# Patient Record
Sex: MALE | Race: BLACK OR AFRICAN AMERICAN | NOT HISPANIC OR LATINO | Employment: PART TIME | URBAN - METROPOLITAN AREA
[De-identification: names, ages, dates, MRNs, and addresses within clinical notes are randomized per-mention and may not be internally consistent; named-entity substitution may affect disease eponyms.]

---

## 2023-04-21 ENCOUNTER — APPOINTMENT (OUTPATIENT)
Dept: LAB | Facility: CLINIC | Age: 33
End: 2023-04-21

## 2023-06-15 ENCOUNTER — OFFICE VISIT (OUTPATIENT)
Dept: OBGYN CLINIC | Facility: CLINIC | Age: 33
End: 2023-06-15
Payer: OTHER MISCELLANEOUS

## 2023-06-15 VITALS
HEART RATE: 60 BPM | DIASTOLIC BLOOD PRESSURE: 60 MMHG | WEIGHT: 160 LBS | BODY MASS INDEX: 21.2 KG/M2 | HEIGHT: 73 IN | SYSTOLIC BLOOD PRESSURE: 120 MMHG

## 2023-06-15 DIAGNOSIS — S92.315A CLOSED NONDISPLACED FRACTURE OF FIRST METATARSAL BONE OF LEFT FOOT, INITIAL ENCOUNTER: Primary | ICD-10-CM

## 2023-06-15 PROCEDURE — 28470 CLTX METATARSAL FX WO MNP EA: CPT | Performed by: ORTHOPAEDIC SURGERY

## 2023-06-15 PROCEDURE — 99203 OFFICE O/P NEW LOW 30 MIN: CPT | Performed by: ORTHOPAEDIC SURGERY

## 2023-06-15 NOTE — PROGRESS NOTES
Assessment/Plan:  1  Closed nondisplaced fracture of first metatarsal bone of left foot, initial encounter  Cam Boot          Vanessa Downs has pain to the base of his first metatarsal and a subtle lucency is visualized on x-ray concerning for nondisplaced fracture  I recommended placing the patient in a cam walker boot which can provide better support and no discomfort with walking  He will likely heal in a total of 4 weeks and has already been immobilized for 1 week  He will follow-up with me in 3 weeks for repeat x-ray and evaluation  I have recommended light duty at work at this time  Fracture / Dislocation Treatment    Date/Time: 6/15/2023 9:00 AM    Performed by: Sb Mohamud DO  Authorized by: Sb Mohamud DO    Patient Location:  Clinic  [place] Protocol:  Consent: Verbal consent obtained  Consent given by: patient  Radiology Images displayed and confirmed  If images not available, report reviewed: imaging studies available      Injury location:  Foot  Location details:  Left foot  Injury type:  Fracture  Fracture type: first metatarsal    Neurovascular status: Neurovascularly intact    Manipulation performed?: No    Immobilization: Cam boot  Subjective:   Chyna Juárez is a 26 y o  male who presents to the office for evaluation for a left foot injury  This is a Worker's Compensation injury  He had an injury to his left foot 1 week ago where he was carrying a box filled with heavy car parts that fell out and fell directly on the top of his left foot  He had pain and discomfort to the dorsum of the left foot and went to urgent care  X-rays demonstrated concern for a nondisplaced fracture at the base of his first metatarsal   He was placed in a postop shoe and referred to our office  He has been walking in the postop shoe and states that this has been continuing to give him pain  The shoe does help  He has aching throbbing pain to the dorsum of the left foot that worsens with walking  He denies any significant bruising but feels like the area is swollen  Review of Systems   Constitutional: Negative for chills, fever and unexpected weight change  HENT: Negative for hearing loss, nosebleeds and sore throat  Eyes: Negative for pain, redness and visual disturbance  Respiratory: Negative for cough, shortness of breath and wheezing  Cardiovascular: Negative for chest pain, palpitations and leg swelling  Gastrointestinal: Negative for abdominal pain, nausea and vomiting  Endocrine: Negative for polyphagia and polyuria  Genitourinary: Negative for dysuria and hematuria  Musculoskeletal:        See HPI   Skin: Negative for rash and wound  Neurological: Negative for dizziness, numbness and headaches  Psychiatric/Behavioral: Negative for decreased concentration and suicidal ideas  The patient is not nervous/anxious  History reviewed  No pertinent past medical history  Past Surgical History:   Procedure Laterality Date   • EYE SURGERY Left     when i was little maybe 3       History reviewed  No pertinent family history  Social History     Occupational History   • Not on file   Tobacco Use   • Smoking status: Never   • Smokeless tobacco: Never   Vaping Use   • Vaping Use: Never used   Substance and Sexual Activity   • Alcohol use: Never   • Drug use: Never   • Sexual activity: Yes     Partners: Female     Birth control/protection: Condom Male, None       No current outpatient medications on file  No Known Allergies    Objective:  Vitals:    06/15/23 0916   BP: 120/60   Pulse: 60            Ortho Exam    Physical Exam  Vitals and nursing note reviewed  Constitutional:       Appearance: Normal appearance  He is well-developed  HENT:      Head: Normocephalic and atraumatic  Right Ear: External ear normal       Left Ear: External ear normal    Eyes:      General: No scleral icterus  Extraocular Movements: Extraocular movements intact  Conjunctiva/sclera: Conjunctivae normal    Cardiovascular:      Rate and Rhythm: Normal rate  Pulmonary:      Effort: Pulmonary effort is normal  No respiratory distress  Musculoskeletal:      Cervical back: Normal range of motion and neck supple  Feet:       Comments: See Ortho exam   Feet:      Comments: Tenderness to palpation over entire first metatarsal most significant at base of first metatarsal   Skin:     General: Skin is warm and dry  Neurological:      Mental Status: He is alert and oriented to person, place, and time  Psychiatric:         Behavior: Behavior normal            I have personally reviewed pertinent films in PACS and my interpretation is as follows:  X-rays of the left foot demonstrate a subtle vertical linear lucency concerning for nondisplaced fracture at the base of the first metatarsal      This document was created using speech voice recognition software  Grammatical errors, random word insertions, pronoun errors, and incomplete sentences are an occasional consequence of this system due to software limitations, ambient noise, and hardware issues  Any formal questions or concerns about content, text, or information contained within the body of this dictation should be directly addressed to the provider for clarification

## 2023-06-15 NOTE — LETTER
Marita 15, 2023     Patient: Jason Andre  YOB: 1990  Date of Visit: 6/15/2023      To Whom it May Concern:    Jason Andre is under my professional care  Arun Batres was seen in my office on 6/15/2023  Arun Batres may work at this time with light duty and no prolonged walking for the next 3 weeks  Please allow for use of the cam walker boot at work for 3 weeks  No heavy lifting over 30 pounds for 3 weeks  If you have any questions or concerns, please don't hesitate to call           Sincerely,          Kingsley Arndt, DO

## 2023-07-11 ENCOUNTER — OFFICE VISIT (OUTPATIENT)
Dept: OBGYN CLINIC | Facility: CLINIC | Age: 33
End: 2023-07-11

## 2023-07-11 ENCOUNTER — APPOINTMENT (OUTPATIENT)
Dept: RADIOLOGY | Facility: CLINIC | Age: 33
End: 2023-07-11
Payer: COMMERCIAL

## 2023-07-11 VITALS
BODY MASS INDEX: 23.33 KG/M2 | DIASTOLIC BLOOD PRESSURE: 61 MMHG | WEIGHT: 176 LBS | HEIGHT: 73 IN | SYSTOLIC BLOOD PRESSURE: 119 MMHG | HEART RATE: 82 BPM

## 2023-07-11 DIAGNOSIS — S92.315A CLOSED NONDISPLACED FRACTURE OF FIRST METATARSAL BONE OF LEFT FOOT, INITIAL ENCOUNTER: ICD-10-CM

## 2023-07-11 DIAGNOSIS — S92.315A CLOSED NONDISPLACED FRACTURE OF FIRST METATARSAL BONE OF LEFT FOOT, INITIAL ENCOUNTER: Primary | ICD-10-CM

## 2023-07-11 PROCEDURE — 99024 POSTOP FOLLOW-UP VISIT: CPT | Performed by: ORTHOPAEDIC SURGERY

## 2023-07-11 PROCEDURE — 73660 X-RAY EXAM OF TOE(S): CPT

## 2023-07-11 NOTE — PROGRESS NOTES
Assessment/Plan:  1. Closed nondisplaced fracture of first metatarsal bone of left foot, initial encounter  XR toe left great min 2 views          Ruth Neri has improvement in his left foot pain and no significant tenderness along the first metatarsal.  I can no longer appreciate any lucency on his x-ray. He is cleared for work without restriction at this time. Follow-up with me only if needed. Subjective:   Harshad Conner is a 26 y.o. male who presents to the office for follow-up for left foot injury. This is a Worker's Compensation injury. He had an injury 4 weeks ago where he dropped a heavy car part on the top of his left foot. He had a lucency on his x-ray concerning for metatarsal fracture. He was placed in a cam walker boot. He states today his foot feels much better and he is no significant pain in the foot and has been wearing the boot up until this morning. He denies any new injury. He has been on light duty at work since the injury. Review of Systems   Constitutional: Negative for chills, fever and unexpected weight change. HENT: Negative for hearing loss, nosebleeds and sore throat. Eyes: Negative for pain, redness and visual disturbance. Respiratory: Negative for cough, shortness of breath and wheezing. Cardiovascular: Negative for chest pain, palpitations and leg swelling. Gastrointestinal: Negative for abdominal pain, nausea and vomiting. Endocrine: Negative for polyphagia and polyuria. Genitourinary: Negative for dysuria and hematuria. Musculoskeletal:        See HPI   Skin: Negative for rash and wound. Neurological: Negative for dizziness, numbness and headaches. Psychiatric/Behavioral: Negative for decreased concentration and suicidal ideas. The patient is not nervous/anxious. History reviewed. No pertinent past medical history. Past Surgical History:   Procedure Laterality Date   • EYE SURGERY Left     when i was little maybe 3       History reviewed. No pertinent family history. Social History     Occupational History   • Not on file   Tobacco Use   • Smoking status: Never   • Smokeless tobacco: Never   Vaping Use   • Vaping Use: Never used   Substance and Sexual Activity   • Alcohol use: Never   • Drug use: Never   • Sexual activity: Yes     Partners: Female     Birth control/protection: Condom Male, None       No current outpatient medications on file. No Known Allergies    Objective:  Vitals:    07/11/23 1622   BP: 119/61   Pulse: 82            Ortho Exam    Physical Exam  Vitals and nursing note reviewed. Constitutional:       Appearance: Normal appearance. He is well-developed. HENT:      Head: Normocephalic and atraumatic. Right Ear: External ear normal.      Left Ear: External ear normal.      Nose: Nose normal.   Eyes:      General: No scleral icterus. Extraocular Movements: Extraocular movements intact. Conjunctiva/sclera: Conjunctivae normal.   Cardiovascular:      Rate and Rhythm: Normal rate. Pulmonary:      Effort: Pulmonary effort is normal. No respiratory distress. Musculoskeletal:      Cervical back: Normal range of motion and neck supple. Comments: See Ortho exam   Feet:      Comments: No tenderness to first metatarsal or great toe  Skin:     General: Skin is warm and dry. Neurological:      General: No focal deficit present. Mental Status: He is alert and oriented to person, place, and time. Psychiatric:         Behavior: Behavior normal.         I have personally reviewed pertinent films in PACS and my interpretation is as follows:  X-rays of the left foot today demonstrate no visible fracture in the first metatarsal.    This document was created using speech voice recognition software. Grammatical errors, random word insertions, pronoun errors, and incomplete sentences are an occasional consequence of this system due to software limitations, ambient noise, and hardware issues.    Any formal questions or concerns about content, text, or information contained within the body of this dictation should be directly addressed to the provider for clarification.

## 2023-07-11 NOTE — LETTER
July 11, 2023     Patient: Francheska Coronado  YOB: 1990  Date of Visit: 7/11/2023      To Whom it May Concern:    Francheska Coronado is under my professional care. Leyla Bryson was seen in my office on 7/11/2023. Leyla Bryson may return to work on 7/12/23 without restriction . If you have any questions or concerns, please don't hesitate to call.          Sincerely,          Kate Smith DO        CC: No Recipients

## 2023-08-21 ENCOUNTER — TELEPHONE (OUTPATIENT)
Age: 33
End: 2023-08-21

## 2023-08-21 NOTE — TELEPHONE ENCOUNTER
Caller: Arash    Doctor/[facility]#: [phone]      What needs to be faxed: AVS and work note from 7/11/23    ATTN to: claim # [id number]    Fax#: [phone]      Documents were successfully e-faxed

## 2023-09-14 ENCOUNTER — OFFICE VISIT (OUTPATIENT)
Dept: VASCULAR SURGERY | Facility: CLINIC | Age: 33
End: 2023-09-14
Payer: COMMERCIAL

## 2023-09-14 VITALS
HEART RATE: 71 BPM | RESPIRATION RATE: 18 BRPM | SYSTOLIC BLOOD PRESSURE: 120 MMHG | OXYGEN SATURATION: 98 % | HEIGHT: 73 IN | BODY MASS INDEX: 23.46 KG/M2 | WEIGHT: 177 LBS | DIASTOLIC BLOOD PRESSURE: 72 MMHG

## 2023-09-14 DIAGNOSIS — I83.819 VARICOSE VEINS OF UNSPECIFIED LOWER EXTREMITY WITH PAIN: ICD-10-CM

## 2023-09-14 PROCEDURE — 99204 OFFICE O/P NEW MOD 45 MIN: CPT | Performed by: NURSE PRACTITIONER

## 2023-09-14 NOTE — PATIENT INSTRUCTIONS
- Call the office if you experience any changes to your legs or feet such as new pain, redness or swelling.    - Stay active. Exercise everyday. Walking is the recommended exercise with a goal of 30 min 3-4 times a week. A healthy weight can assist in decreasing varicose vein symptoms.     - Wear Compression. Put them on in the morning, wear all day and take off before bed at night.     -Elevate your legs above the level of your heart. Elevate for 15 minutes 3-4 times a day. -When looking at buying compression, look for "gradient compression" with a weight 20-30mmHg (medium weight), knee high is fine.  -Try "[email or web address][email or web address]"    -A good brand is Sigvaris, knee high. Jobst is a good athletic brand.

## 2023-09-14 NOTE — ASSESSMENT & PLAN NOTE
57-year-old presents with symptomatic varicose veins.    -Bulging truncal varicosities with spider telangiectasia of the lower legs with swelling and discomfort L>R. -Denies use of compression in the past.  -No hx of vein surgeries. -Reviewed pathophysiology of venous insuffiencey and varicose veins and treatment with conservative measures at this time with leg compression, elevation and exercise. Pt verbalized understanding and is agreeable to this plan. Recommendations    -Start wearing compression. RX given today with education on how to use. -Leg elevation. Goal of 3-4 times a day 15 minutes at a time.  -Increase exercise and ambulation. Goal of 3-4 times a week for 30 min. -Apply moisturizing cream to the b/l legs to maintain skin integrity and prevent breakdown.  -Call the office with any new or worsening symptoms.

## 2023-09-14 NOTE — PROGRESS NOTES
Assessment/Plan:   Varicose veins of unspecified lower extremity with pain  40-year-old presents with symptomatic varicose veins.    -Bulging truncal varicosities with spider telangiectasia of the lower legs with swelling and discomfort L>R. -Denies use of compression in the past.  -No hx of vein surgeries. -Reviewed pathophysiology of venous insuffiencey and varicose veins and treatment with conservative measures at this time with leg compression, elevation and exercise. Pt verbalized understanding and is agreeable to this plan. Recommendations    -Start wearing compression. RX given today with education on how to use. -Leg elevation. Goal of 3-4 times a day 15 minutes at a time.  -Increase exercise and ambulation. Goal of 3-4 times a week for 30 min. -Apply moisturizing cream to the b/l legs to maintain skin integrity and prevent breakdown.  -Call the office with any new or worsening symptoms. Diagnoses and all orders for this visit:    Varicose veins of unspecified lower extremity with pain  -     Ambulatory Referral to Vascular Surgery  -     Compression Stocking  -     Compression Stocking            Subjective:      Patient ID: Graciela Ansari is a 26 y.o. male     Patient is new to our practice and was self referred. Pt c/o redness, warmth, and itchiness in the LLE. Pt has bulging and notice this start about 15 years ago. Pt does not wear compression stockings. Chief Complaint: Has notices b/l varicose veins for the last 15 years. Pt reports on and off compression use in the past. Denies leg elevation. Reports mild pain with swelling with long periods of standing.   -Denies claudication, denies rest pain, no wounds/ tissue loss.        HPI    Graciela Ansari is seen for evaluation of: [x]Varicose veins/legs  [x]Spider veins/legs  []Spider veins/face  []Venous stasis ulcer  []Superficial thrombophlebitis  []Other -      He complains of []none  [x]bulging veins  [x]dilated veins  [x]discolored veins         There is []no edema              []right leg edema  [x]left leg edema       []bilateral lower extremity edema     There is   []no leg pain          []right leg pain  [x]left leg pain         []bilateral leg pain  []bilateral leg pain; L>R   []bilateral leg pain; R>L     Pain is described as []aching              [x]itching  []sharp                []burning  [x]throbbing         []stinging  [x]heavy                []dull  []other -      Symptoms have been ongoing for:  7 years   There is  [x]no pertinent medical history  []history of DVT  []PE  []superficial venous thrombosis     Prior treatment includes [x]none  []EVLT  []OTC stockings  []prescription compression stockings  []vein ligation  []vein stripping  []stab phlebectomy  []sclerotherapy injections  []Other -      Current treatment includes []none  [x]compression socks  []avoiding tight clothing  [x]leg elevation  []rest  [x]exercise  []weight management  []skin care  []periodic evaluation   []Other-     Treatment has been []effective  [x]ineffective     Review of Systems   Constitutional: Negative. HENT: Negative. Eyes: Negative. Respiratory: Negative. Negative for shortness of breath. Cardiovascular: Positive for leg swelling. Negative for chest pain. Painful vein LLE. Gastrointestinal: Negative. Endocrine: Negative. Genitourinary: Negative. Musculoskeletal: Negative. Skin: Negative. Allergic/Immunologic: Negative. Neurological: Negative. Hematological: Negative. Psychiatric/Behavioral: Negative. The following portions of the patient's history were reviewed and updated as appropriate: allergies, current medications, past family history, past medical history, past social history, past surgical history and problem list.  Objective     Physical Exam  Vitals and nursing note reviewed. Constitutional:       Appearance: Normal appearance.    HENT:      Head: Normocephalic and atraumatic. Cardiovascular:      Rate and Rhythm: Normal rate and regular rhythm. Pulses:           Radial pulses are 2+ on the right side and 2+ on the left side. Dorsalis pedis pulses are 2+ on the right side and 2+ on the left side. Posterior tibial pulses are 2+ on the right side and 2+ on the left side. Pulmonary:      Effort: Pulmonary effort is normal.   Musculoskeletal:      Right lower leg: Edema (trace) present. Left lower leg: Edema (trace) present. Neurological:      General: No focal deficit present. Mental Status: He is alert and oriented to person, place, and time. Psychiatric:         Mood and Affect: Mood normal.         Behavior: Behavior normal.       Objective:     Vitals:    09/14/23 1313   BP: 120/72   BP Location: Left arm   Patient Position: Sitting   Pulse: 71   Resp: 18   SpO2: 98%   Weight: 80.3 kg (177 lb)   Height: 6' 1" (1.854 m)       Patient Active Problem List   Diagnosis   • Closed nondisplaced fracture of first left metatarsal bone   • Varicose veins of unspecified lower extremity with pain       Past Surgical History:   Procedure Laterality Date   • EYE SURGERY Left     when i was little maybe 3       History reviewed. No pertinent family history.     Social History     Socioeconomic History   • Marital status: /Civil Union     Spouse name: Not on file   • Number of children: Not on file   • Years of education: Not on file   • Highest education level: Not on file   Occupational History   • Not on file   Tobacco Use   • Smoking status: Never   • Smokeless tobacco: Never   Vaping Use   • Vaping Use: Never used   Substance and Sexual Activity   • Alcohol use: Never   • Drug use: Never   • Sexual activity: Yes     Partners: Female     Birth control/protection: Condom Male, None   Other Topics Concern   • Not on file   Social History Narrative   • Not on file     Social Determinants of Health     Financial Resource Strain: Not on file Food Insecurity: Not on file   Transportation Needs: Not on file   Physical Activity: Not on file   Stress: Not on file   Social Connections: Not on file   Intimate Partner Violence: Not on file   Housing Stability: Not on file       No Known Allergies    No current outpatient medications on file. No Known Allergies    No current outpatient medications on file. I have spent a total time of 30 minutes on 09/14/23 in caring for this patient including Risks and benefits of tx options, Instructions for management, Patient and family education, Importance of tx compliance, Counseling / Coordination of care, Documenting in the medical record and Reviewing / ordering tests, medicine, procedures  .

## 2023-12-14 ENCOUNTER — OFFICE VISIT (OUTPATIENT)
Dept: VASCULAR SURGERY | Facility: CLINIC | Age: 33
End: 2023-12-14
Payer: COMMERCIAL

## 2023-12-14 VITALS
WEIGHT: 183.6 LBS | DIASTOLIC BLOOD PRESSURE: 70 MMHG | HEIGHT: 73 IN | SYSTOLIC BLOOD PRESSURE: 108 MMHG | HEART RATE: 70 BPM | BODY MASS INDEX: 24.33 KG/M2

## 2023-12-14 DIAGNOSIS — I83.819 VARICOSE VEINS OF UNSPECIFIED LOWER EXTREMITY WITH PAIN: Primary | ICD-10-CM

## 2023-12-14 PROCEDURE — 99214 OFFICE O/P EST MOD 30 MIN: CPT | Performed by: NURSE PRACTITIONER

## 2023-12-14 NOTE — ASSESSMENT & PLAN NOTE
75-year-old Male presents with symptomatic varicose veins.     -Bulging varicosities of the L>R lower legs with swelling and discomfort L>R. -Reports use of compression with no change or improvement in his symptoms. .  -No hx of vein surgeries. Denies hx of DVT. -Reviewed pathophysiology of venous insufficiency and varicose veins and treatment with conservative measures and completion of LEVDR and return to the office for review with a vascular surgeon. Pt verbalized understanding and is agreeable to this plan. Recommendations    -Complete LEVDR and return to the office for review with a vascular surgeon.  -Start wearing compression. RX given today with education on how to use. -Leg elevation. Goal of 3-4 times a day 15 minutes at a time.  -Increase exercise and ambulation. Goal of 3-4 times a week for 30 min. -Apply moisturizing cream to the b/l legs to maintain skin integrity and prevent breakdown.  -Call the office with any new or worsening symptoms. Yes

## 2023-12-14 NOTE — PROGRESS NOTES
Assessment/Plan:    Varicose veins of unspecified lower extremity with pain  40-year-old Male presents with symptomatic varicose veins.     -Bulging varicosities of the L>R lower legs with swelling and discomfort L>R. -Reports use of compression with no change or improvement in his symptoms. .  -No hx of vein surgeries. Denies hx of DVT. -Reviewed pathophysiology of venous insufficiency and varicose veins and treatment with conservative measures and completion of LEVDR and return to the office for review with a vascular surgeon. Pt verbalized understanding and is agreeable to this plan. Recommendations    -Complete LEVDR and return to the office for review with a vascular surgeon.  -Start wearing compression. RX given today with education on how to use. -Leg elevation. Goal of 3-4 times a day 15 minutes at a time.  -Increase exercise and ambulation. Goal of 3-4 times a week for 30 min. -Apply moisturizing cream to the b/l legs to maintain skin integrity and prevent breakdown.  -Call the office with any new or worsening symptoms. Diagnoses and all orders for this visit:    Varicose veins of unspecified lower extremity with pain  -     VAS reflux lower limb venous duplex study with reflux assessment, complete bilateral; Future          Subjective:      Patient ID: Tariq Figueroa is a 26 y.o. male. Patient presents for 3 month f/u on bilateral lower extremity varicose veins. Pt reports bulging varicosities are accompanied by swelling and discomfort, L>R. He has been wearing compression daily but states there has been no improvement in symptoms since LOV in September. He also elevates his legs daily and walks regularly. He is not a smoker. Pt presents to the office for follow up after wearing compression stockings consistently for 3 months. Reports little to no improvement of symptoms with compression. States he has been compliant with compression use.           The following portions of the patient's history were reviewed and updated as appropriate: allergies, current medications, past family history, past medical history, past social history, past surgical history, and problem list.    Review of Systems   Respiratory:  Negative for shortness of breath. Cardiovascular:  Positive for leg swelling. Negative for chest pain. Skin:         Protruding varicosities, BLE   All other systems reviewed and are negative. Objective:      /70 (BP Location: Left arm, Patient Position: Sitting, Cuff Size: Standard)   Pulse 70   Ht 6' 1" (1.854 m)   Wt 83.3 kg (183 lb 9.6 oz)   BMI 24.22 kg/m²          Physical Exam  Vitals and nursing note reviewed. Constitutional:       Appearance: Normal appearance. HENT:      Head: Normocephalic and atraumatic. Cardiovascular:      Rate and Rhythm: Normal rate and regular rhythm. Pulses: Normal pulses. Radial pulses are 2+ on the right side and 2+ on the left side. Dorsalis pedis pulses are 2+ on the right side and 2+ on the left side. Posterior tibial pulses are 2+ on the right side and 2+ on the left side. Heart sounds: Normal heart sounds. Pulmonary:      Effort: Pulmonary effort is normal.      Breath sounds: Normal breath sounds. Musculoskeletal:         General: No tenderness. Cervical back: Normal range of motion. Right lower leg: No edema. Left lower leg: No edema. Skin:     General: Skin is warm and dry. Neurological:      General: No focal deficit present. Mental Status: He is alert and oriented to person, place, and time. Sensory: No sensory deficit. Gait: Gait normal.   Psychiatric:         Mood and Affect: Mood normal.         Behavior: Behavior normal.           I have reviewed and made appropriate changes to the review of systems input by the medical assistant.                               Vitals:    12/14/23 1407   BP: 108/70   BP Location: Left arm   Patient Position: Sitting   Cuff Size: Standard   Pulse: 70   Weight: 83.3 kg (183 lb 9.6 oz)   Height: 6' 1" (1.854 m)       Patient Active Problem List   Diagnosis    Closed nondisplaced fracture of first left metatarsal bone    Varicose veins of unspecified lower extremity with pain       Past Surgical History:   Procedure Laterality Date    EYE SURGERY Left     when i was little maybe 3       History reviewed. No pertinent family history. Social History     Socioeconomic History    Marital status: /Civil Union     Spouse name: Not on file    Number of children: Not on file    Years of education: Not on file    Highest education level: Not on file   Occupational History    Not on file   Tobacco Use    Smoking status: Never    Smokeless tobacco: Never   Vaping Use    Vaping status: Never Used   Substance and Sexual Activity    Alcohol use: Never    Drug use: Never    Sexual activity: Yes     Partners: Female     Birth control/protection: Condom Male, None   Other Topics Concern    Not on file   Social History Narrative    Not on file     Social Determinants of Health     Financial Resource Strain: Not on file   Food Insecurity: Not on file   Transportation Needs: Not on file   Physical Activity: Not on file   Stress: Not on file   Social Connections: Not on file   Intimate Partner Violence: Not on file   Housing Stability: Not on file       No Known Allergies    No current outpatient medications on file. I have spent a total time of 20 minutes on 12/14/23 in caring for this patient including Diagnostic results, Risks and benefits of tx options, Instructions for management, Patient and family education, Documenting in the medical record, Reviewing / ordering tests, medicine, procedures  , and Obtaining or reviewing history  .

## 2023-12-14 NOTE — PATIENT INSTRUCTIONS
-Complete lower extremity ultrasound and return to the office for review with a vascular surgeon.     - Call the office if you experience any changes to your legs or feet such as new pain, redness or swelling.    - Stay active. Exercise everyday. Walking is the recommended exercise with a goal of 30 min 3-4 times a week. A healthy weight can assist in decreasing varicose vein symptoms.     - Wear Compression. Put them on in the morning, wear all day and take off before bed at night.     -Elevate your legs above the level of your heart. Elevate for 15 minutes 3-4 times a day. -When looking at buying compression, look for "gradient compression" with a weight 20-30mmHg (medium weight), knee high is fine.  -Try "[employer]"    -A good brand is Sigvaris, soft opaque, knee high.

## 2023-12-19 ENCOUNTER — HOSPITAL ENCOUNTER (OUTPATIENT)
Dept: RADIOLOGY | Facility: HOSPITAL | Age: 33
Discharge: HOME | End: 2023-12-19
Payer: COMMERCIAL

## 2023-12-19 DIAGNOSIS — I83.819 VARICOSE VEINS OF UNSPECIFIED LOWER EXTREMITY WITH PAIN: ICD-10-CM

## 2023-12-19 PROCEDURE — 93970 EXTREMITY STUDY: CPT

## 2023-12-19 PROCEDURE — 93970 EXTREMITY STUDY: CPT | Performed by: SURGERY

## 2024-01-12 ENCOUNTER — OFFICE VISIT (OUTPATIENT)
Dept: VASCULAR SURGERY | Facility: CLINIC | Age: 34
End: 2024-01-12
Payer: COMMERCIAL

## 2024-01-12 VITALS
DIASTOLIC BLOOD PRESSURE: 72 MMHG | HEART RATE: 77 BPM | BODY MASS INDEX: 23.99 KG/M2 | OXYGEN SATURATION: 98 % | HEIGHT: 73 IN | RESPIRATION RATE: 18 BRPM | WEIGHT: 181 LBS | SYSTOLIC BLOOD PRESSURE: 110 MMHG

## 2024-01-12 DIAGNOSIS — I83.819 VARICOSE VEINS OF UNSPECIFIED LOWER EXTREMITY WITH PAIN: Primary | ICD-10-CM

## 2024-01-12 PROCEDURE — 99215 OFFICE O/P EST HI 40 MIN: CPT | Performed by: SURGERY

## 2024-01-12 RX ORDER — UREA 10 %
500 LOTION (ML) TOPICAL 2 TIMES DAILY
COMMUNITY

## 2024-01-12 RX ORDER — OMEGA-3/DHA/EPA/FISH OIL 60 MG-90MG
CAPSULE ORAL
COMMUNITY

## 2024-01-12 NOTE — PROGRESS NOTES
Assessment/Plan:    Varicose veins of unspecified lower extremity with pain  34-year-old male with primarily left lower extremity symptomatic varicose veins.  He has a cluster of symptomatic veins on the posterior proximal left calf as well as a large cluster along his medial distal left calf and dorsal aspect of his left foot.  He was seen previously in the office by our vascular nurse practitioner he was given a trial of compression stockings which has helped to a limited degree.  He had a lower extremity venous reflux duplex done for his left leg which shows segmental left great saphenous vein reflux primarily around the knee and proximal calf as well as short segment of reflux in the left small saphenous vein.  The proximal left GSV is very small as is the small saphenous vein.  There is no DVT and there is some degree of deep reflux as well on the left side.  Given the distribution of his varicosities I think maximizing the extent of great saphenous vein closure all the way to the ankle would be optimal for him I think the best way to achieve this would be a venous seal for his left great saphenous vein.  The small saphenous vein I think is too small to close.  And I would recommend phlebectomies of his posterior left calf and left ankle varicosities at the same time I discussed this with him in detail and explained that if it was not technically feasible to close the great saphenous due to the size that we would proceed with just phlebectomies.  Risks benefits and alternative therapies were discussed with him in detail and he wished to take some more time to decide.  If he chooses to move forward with surgery he can be scheduled without another office visit for left great saphenous vein VenaSeal and left lower extremity phlebectomies.  If he elects not to proceed with surgery I recommended continued compression therapy and as needed follow-up      His right leg has very limited varicosities mostly by the ankle  "and the foot with some scattered spider veins this would not require any surgical treatment and I recommended continued compression therapy for his right lower extremity.       Diagnoses and all orders for this visit:    Varicose veins of unspecified lower extremity with pain    Other orders  -     magnesium gluconate (MAGONATE) 500 mg tablet; Take 500 mg by mouth 2 (two) times a day  -     Omega-3 Fatty Acids (Fish Oil) 500 MG CAPS; Take by mouth          Subjective:      Patient ID: Cuba Hu is a 34 y.o. male.    Patient had a LEVDR on 12/19/23.  Pt c/o painful varicose veins in the LLE for the past 10-15 years.  Pt has been wearing compression stockings. Pt has found some relief with compression use. Painful left leg varicosities.  Limited improvement with compression.  Np prior venous surgery. Prior DVT when he was younger and treated with AC in [facility].  HPI    The following portions of the patient's history were reviewed and updated as appropriate: allergies, current medications, past family history, past medical history, past social history, past surgical history, and problem list.    Review of Systems   Constitutional: Negative.    HENT: Negative.     Eyes: Negative.    Respiratory: Negative.     Cardiovascular:  Positive for leg swelling.        Painful veins LLE   Gastrointestinal: Negative.    Endocrine: Negative.    Genitourinary: Negative.    Musculoskeletal: Negative.    Skin: Negative.    Allergic/Immunologic: Negative.    Neurological: Negative.    Hematological: Negative.    Psychiatric/Behavioral: Negative.             I have reviewed the ROS above and made changes as needed.      Objective:      /72 (BP Location: Left arm, Patient Position: Sitting)   Pulse 77   Resp 18   Ht 6' 1\" (1.854 m)   Wt 82.1 kg (181 lb)   SpO2 98%   BMI 23.88 kg/m²          Physical Exam      General  Exam: alert, awake, oriented, no distress, consistent with stated age    Integumentary  Exam: warm, dry, " no gross lesions, no bruises and normal color    Head and Neck  Exam: supple, no bruits, trachea midline, no JVD, no mass or palpable nodes    Eye  Exam: extraoccular movements intact, no scleral icterus, sclera clear, pupils equal round and reactive to light    ENMT  Exam: oral mucosa pink and moist    Chest and Lung  Exam: chest normal without deformity, bilaterally expansive, clear to auscultation    Cardiovascular  Exam: regular rate, regular rhythm, no murmurs, no rubs or gallops    Adbomen  Exam: soft, non-tender, non-distended, no pulsatile abdominal masses, no abdominal bruit    Peripheral Vascular  Exam: no clubbing of the digits of the upper extremity, no cyanosis, no edema, both feet are warm, radial pulses 2+ bilaterally, skin well perfused    Scattered bilateral LE spider veins  Left posterior calf and medial ankle VV  Small VV right ankle    No widened popliteal pulse noted bilaterally    Upper Extremity:  Palpation: Radial pulse- Bilateral 2+    Lower Extremity:  Palpation: Femoral pulse- Bilateral 2+         Popliteal pulse - Bilateral 2+        Dorsalis Pedis - Bilateral 2+         Neurologic  Exam:alert, non-focal, oriented x 3, cranial nerves II-XII grossly intact        EVLT Operative Scheduling Information:    Hospital:  [facility]    Physician:  Veronique    Surgery: left GSV venaseal and phlebectomies    Urgency:  Standard    Level:  Level 4: Outpatients to be scheduled for screening procedures and elective surgery that can be delayed for longer than one month without reasonable expectation of detriment to patient.    Case Length:  Normal    Post-op Bed:  Outpatient    OR Table:  Standard    Equipment Needs:  Rep: Venaseal rep    Medication Instructions:  None    Hydration:  No    Contrast Allergy:  No    Venous Clinical Severity Scores (VCSS)  Item Absent   (0 points) Mild   (1 point) Moderate   (2 points) Severe   (3 points)   Pain [] None [] Occasional [] Daily [x] Daily limiting   Varicose veins  [] None [] Few [x] Calf or thigh [] Calf and thigh   Venous edema [] None [x] Foot and ankle [] Above ankle, below knee [] To knee of above   Skin pigmentation [] None [x] Perimalleolar [] Diffuse, lower 1/3 calf [] Wider, above lower 1/3 calf   Inflammation [] None [x] Perimalleolar [] Diffuse, lower 1/3 calf [] Wider, above lower 1/3 calf   Induration [] None [x] Perimalleolar [] Diffuse, lower 1/3 calf [] Wider, above lower 1/3 calf   No. active ulcers [x] None [] 1 [] 2 [] ?3   Active ulcer size [] None [] <2 cm [] 2 - 6 cm [] >6 cm   Ulcer duration [x] None [] <3 months [] 3 - 12 months [] >1 year   Compression therapy [] None [] Intermittent [] Most days [x] Fully comply   Total 12          CEAP Clinical Classification  [x] Symptomatic   [] Asymptomatic     [] Class 0 No visible or palpable signs of venous disease   [] Class 1 Telangiectasies or reticular veins   [x] Class 2 Varicose veins; distinguished from reticular veins by a diameter of 3mm or more   [] Class 3 Edema   [] Class 4 Changes in skin and subcutaneous tissue secondary to CVD    [] Class 4a Pigmentation or eczema   [] Class 4b Lipodermatosclerosis or atrophie sarai   [] Class 5 Healed venous ulcer   [] Class 6 Active venous ulcer           BLE CEAP 2

## 2024-01-12 NOTE — ASSESSMENT & PLAN NOTE
34-year-old male with primarily left lower extremity symptomatic varicose veins.  He has a cluster of symptomatic veins on the posterior proximal left calf as well as a large cluster along his medial distal left calf and dorsal aspect of his left foot.  He was seen previously in the office by our vascular nurse practitioner he was given a trial of compression stockings which has helped to a limited degree.  He had a lower extremity venous reflux duplex done for his left leg which shows segmental left great saphenous vein reflux primarily around the knee and proximal calf as well as short segment of reflux in the left small saphenous vein.  The proximal left GSV is very small as is the small saphenous vein.  There is no DVT and there is some degree of deep reflux as well on the left side.  Given the distribution of his varicosities I think maximizing the extent of great saphenous vein closure all the way to the ankle would be optimal for him I think the best way to achieve this would be a venous seal for his left great saphenous vein.  The small saphenous vein I think is too small to close.  And I would recommend phlebectomies of his posterior left calf and left ankle varicosities at the same time I discussed this with him in detail and explained that if it was not technically feasible to close the great saphenous due to the size that we would proceed with just phlebectomies.  Risks benefits and alternative therapies were discussed with him in detail and he wished to take some more time to decide.  If he chooses to move forward with surgery he can be scheduled without another office visit for left great saphenous vein VenaSeal and left lower extremity phlebectomies.  If he elects not to proceed with surgery I recommended continued compression therapy and as needed follow-up      His right leg has very limited varicosities mostly by the ankle and the foot with some scattered spider veins this would not require any  surgical treatment and I recommended continued compression therapy for his right lower extremity.

## 2025-06-30 ENCOUNTER — APPOINTMENT (OUTPATIENT)
Dept: RADIOLOGY | Facility: CLINIC | Age: 35
End: 2025-06-30
Attending: FAMILY MEDICINE
Payer: COMMERCIAL

## 2025-06-30 ENCOUNTER — OFFICE VISIT (OUTPATIENT)
Dept: URGENT CARE | Facility: CLINIC | Age: 35
End: 2025-06-30
Payer: COMMERCIAL

## 2025-06-30 VITALS — RESPIRATION RATE: 16 BRPM | DIASTOLIC BLOOD PRESSURE: 61 MMHG | SYSTOLIC BLOOD PRESSURE: 102 MMHG | HEART RATE: 63 BPM

## 2025-06-30 DIAGNOSIS — S90.02XA CONTUSION OF LEFT ANKLE, INITIAL ENCOUNTER: ICD-10-CM

## 2025-06-30 DIAGNOSIS — S90.02XA CONTUSION OF LEFT ANKLE, INITIAL ENCOUNTER: Primary | ICD-10-CM

## 2025-06-30 DIAGNOSIS — S80.02XA CONTUSION OF LEFT KNEE, INITIAL ENCOUNTER: ICD-10-CM

## 2025-06-30 PROCEDURE — 73590 X-RAY EXAM OF LOWER LEG: CPT

## 2025-06-30 PROCEDURE — 99204 OFFICE O/P NEW MOD 45 MIN: CPT | Performed by: FAMILY MEDICINE

## 2025-07-01 NOTE — PROGRESS NOTES
[facility]        NAME: Cuba Hu is a 35 y.o. male  : 1990    MRN: [id number]  DATE: 2025  TIME: 8:29 PM    Assessment and Plan   Contusion of left ankle, initial encounter [S90.02XA]  1. Contusion of left ankle, initial encounter  XR tibia fibula 2 vw left    CANCELED: XR ankle 3+ vw left      2. Contusion of left knee, initial encounter              Patient Instructions     Left knee exam negative. Tib-fib XR negative. ACE wrap given for ankle swelling. Follow up with PCP in 3-5 days if no improvement. Proceed to  ER if symptoms worsen.    If tests have been performed at [facility], our office will contact you with results if changes need to be made to the care plan discussed with you at the visit.  [facility]    Chief Complaint     Chief Complaint   Patient presents with   • Ankle Injury     States he was at a grocery store and slipped on watered and injured L ankle and L knee         History of Present Illness       Ankle Injury   The incident occurred less than 1 hour ago. Incident location: grocery store. Injury mechanism: slipped on water striking shin and knee on the cart. The pain is present in the left leg and left knee. The quality of the pain is described as aching. The pain is moderate. The pain has been Constant since onset. Associated symptoms include an inability to bear weight. Pertinent negatives include no loss of motion or numbness. He reports no foreign bodies present. The symptoms are aggravated by palpation, weight bearing and movement. He has tried nothing for the symptoms.       Review of Systems   Review of Systems   Constitutional:  Negative for chills, fatigue and fever.   HENT:  Negative for postnasal drip and sore throat.    Respiratory:  Negative for cough and shortness of breath.    Cardiovascular:  Negative for chest pain and palpitations.   Gastrointestinal:  Negative for abdominal pain, nausea and  vomiting.   Genitourinary:  Negative for dysuria.   Musculoskeletal:  Positive for gait problem and joint swelling.   Skin:  Negative for rash.   Neurological:  Negative for dizziness, syncope, light-headedness, numbness and headaches.   Psychiatric/Behavioral:  Negative for agitation and confusion.    All other systems reviewed and are negative.        Current Medications     Current Medications[1]    Current Allergies     Allergies as of 06/30/2025   • (No Known Allergies)            The following portions of the patient's history were reviewed and updated as appropriate: allergies, current medications, past family history, past medical history, past social history, past surgical history and problem list.     Past Medical History[2]    Past Surgical History[3]    Family History[4]      Medications have been verified.        Objective   /61   Pulse 63   Resp 16   No LMP for male patient.       Physical Exam     Physical Exam  Vitals reviewed.   Constitutional:       General: He is not in acute distress.     Appearance: Normal appearance. He is not ill-appearing.   HENT:      Head: Normocephalic and atraumatic.     Eyes:      Extraocular Movements: Extraocular movements intact.      Conjunctiva/sclera: Conjunctivae normal.       Musculoskeletal:      Cervical back: Normal range of motion.      Left knee: Bony tenderness (patella) present. No swelling or effusion. Normal range of motion. No LCL laxity, MCL laxity, ACL laxity or PCL laxity.Normal patellar mobility.      Instability Tests: Anterior drawer test negative. Posterior drawer test negative. Anterior Lachman test negative.      Left ankle: Swelling (ankle) present. No ecchymosis. Tenderness present over the lateral malleolus.     Skin:     General: Skin is warm.     Neurological:      General: No focal deficit present.      Mental Status: He is alert.     Psychiatric:         Mood and Affect: Mood normal.         Behavior: Behavior normal.          Judgment: Judgment normal.         Negative left tib-fib XR                 [1]    Current Outpatient Medications:   •  magnesium gluconate (MAGONATE) 500 mg tablet, Take 500 mg by mouth 2 (two) times a day (Patient not taking: Reported on 6/30/2025), Disp: , Rfl:   •  Omega-3 Fatty Acids (Fish Oil) 500 MG CAPS, Take by mouth (Patient not taking: Reported on 6/30/2025), Disp: , Rfl: [2]  No past medical history on file.[3]  Past Surgical History:  Procedure Laterality Date   • EYE SURGERY Left     when i was little maybe 3   [4]  No family history on file.